# Patient Record
Sex: MALE | Race: WHITE | Employment: OTHER | URBAN - METROPOLITAN AREA
[De-identification: names, ages, dates, MRNs, and addresses within clinical notes are randomized per-mention and may not be internally consistent; named-entity substitution may affect disease eponyms.]

---

## 2017-01-31 PROBLEM — I10 HTN (HYPERTENSION): Status: ACTIVE | Noted: 2017-01-31

## 2017-01-31 PROBLEM — E78.5 HLD (HYPERLIPIDEMIA): Status: ACTIVE | Noted: 2017-01-31

## 2017-01-31 PROBLEM — K21.9 GERD (GASTROESOPHAGEAL REFLUX DISEASE): Status: ACTIVE | Noted: 2017-01-31

## 2017-01-31 PROBLEM — I49.1 PAC (PREMATURE ATRIAL CONTRACTION): Status: ACTIVE | Noted: 2017-01-31

## 2017-01-31 PROBLEM — N40.0 BPH (BENIGN PROSTATIC HYPERPLASIA): Status: ACTIVE | Noted: 2017-01-31

## 2017-08-07 PROBLEM — Z98.890 S/P DILATATION OF ESOPHAGEAL STRICTURE: Status: ACTIVE | Noted: 2017-08-07

## 2017-08-07 PROBLEM — Z87.19 S/P DILATATION OF ESOPHAGEAL STRICTURE: Status: ACTIVE | Noted: 2017-08-07

## 2017-08-07 PROBLEM — K44.9 HIATAL HERNIA: Status: ACTIVE | Noted: 2017-08-07

## 2018-04-28 ENCOUNTER — APPOINTMENT (OUTPATIENT)
Dept: CT IMAGING | Age: 83
End: 2018-04-28
Attending: EMERGENCY MEDICINE
Payer: MEDICARE

## 2018-04-28 ENCOUNTER — APPOINTMENT (OUTPATIENT)
Dept: GENERAL RADIOLOGY | Age: 83
End: 2018-04-28
Attending: EMERGENCY MEDICINE
Payer: MEDICARE

## 2018-04-28 ENCOUNTER — HOSPITAL ENCOUNTER (OUTPATIENT)
Age: 83
Setting detail: OBSERVATION
Discharge: HOME | End: 2018-04-29
Attending: EMERGENCY MEDICINE | Admitting: FAMILY MEDICINE
Payer: MEDICARE

## 2018-04-28 DIAGNOSIS — T78.2XXA ANAPHYLAXIS, INITIAL ENCOUNTER: Primary | ICD-10-CM

## 2018-04-28 PROBLEM — I10 HTN (HYPERTENSION): Status: RESOLVED | Noted: 2017-01-31 | Resolved: 2018-04-28

## 2018-04-28 PROBLEM — L50.9 URTICARIA: Status: ACTIVE | Noted: 2018-04-28

## 2018-04-28 PROBLEM — N17.9 AKI (ACUTE KIDNEY INJURY) (HCC): Status: ACTIVE | Noted: 2018-04-28

## 2018-04-28 PROBLEM — I10 HTN (HYPERTENSION): Chronic | Status: ACTIVE | Noted: 2017-01-31

## 2018-04-28 PROBLEM — R22.0 LIP SWELLING: Status: RESOLVED | Noted: 2018-04-28 | Resolved: 2018-04-28

## 2018-04-28 PROBLEM — E78.5 HLD (HYPERLIPIDEMIA): Chronic | Status: ACTIVE | Noted: 2017-01-31

## 2018-04-28 PROBLEM — R22.0 LIP SWELLING: Status: ACTIVE | Noted: 2018-04-28

## 2018-04-28 PROBLEM — R55 SYNCOPE AND COLLAPSE: Status: ACTIVE | Noted: 2018-04-28

## 2018-04-28 PROBLEM — K21.9 GERD (GASTROESOPHAGEAL REFLUX DISEASE): Chronic | Status: ACTIVE | Noted: 2017-01-31

## 2018-04-28 LAB
ALBUMIN SERPL-MCNC: 2.6 G/DL (ref 3.2–4.6)
ALBUMIN/GLOB SERPL: 1.2 {RATIO} (ref 1.2–3.5)
ALP SERPL-CCNC: 30 U/L (ref 50–136)
ALT SERPL-CCNC: 15 U/L (ref 12–65)
ANION GAP SERPL CALC-SCNC: 12 MMOL/L (ref 7–16)
AST SERPL-CCNC: 11 U/L (ref 15–37)
ATRIAL RATE: 77 BPM
BASOPHILS # BLD: 0 K/UL (ref 0–0.2)
BASOPHILS NFR BLD: 0 % (ref 0–2)
BILIRUB SERPL-MCNC: 0.9 MG/DL (ref 0.2–1.1)
BUN SERPL-MCNC: 24 MG/DL (ref 8–23)
CALCIUM SERPL-MCNC: 7.7 MG/DL (ref 8.3–10.4)
CALCULATED P AXIS, ECG09: 20 DEGREES
CALCULATED R AXIS, ECG10: -27 DEGREES
CALCULATED T AXIS, ECG11: 33 DEGREES
CHLORIDE SERPL-SCNC: 106 MMOL/L (ref 98–107)
CO2 SERPL-SCNC: 23 MMOL/L (ref 21–32)
CREAT SERPL-MCNC: 2.11 MG/DL (ref 0.8–1.5)
DIAGNOSIS, 93000: NORMAL
DIFFERENTIAL METHOD BLD: ABNORMAL
EOSINOPHIL # BLD: 0 K/UL (ref 0–0.8)
EOSINOPHIL NFR BLD: 0 % (ref 0.5–7.8)
ERYTHROCYTE [DISTWIDTH] IN BLOOD BY AUTOMATED COUNT: 13.2 % (ref 11.9–14.6)
GLOBULIN SER CALC-MCNC: 2.2 G/DL (ref 2.3–3.5)
GLUCOSE SERPL-MCNC: 177 MG/DL (ref 65–100)
HCT VFR BLD AUTO: 42.1 % (ref 41.1–50.3)
HGB BLD-MCNC: 14.4 G/DL (ref 13.6–17.2)
IMM GRANULOCYTES # BLD: 0 K/UL (ref 0–0.5)
IMM GRANULOCYTES NFR BLD AUTO: 0 % (ref 0–5)
LACTATE BLD-SCNC: 1.7 MMOL/L (ref 0.5–1.9)
LYMPHOCYTES # BLD: 0.7 K/UL (ref 0.5–4.6)
LYMPHOCYTES NFR BLD: 7 % (ref 13–44)
MCH RBC QN AUTO: 29 PG (ref 26.1–32.9)
MCHC RBC AUTO-ENTMCNC: 34.2 G/DL (ref 31.4–35)
MCV RBC AUTO: 84.9 FL (ref 79.6–97.8)
MONOCYTES # BLD: 0.5 K/UL (ref 0.1–1.3)
MONOCYTES NFR BLD: 5 % (ref 4–12)
NEUTS SEG # BLD: 8.8 K/UL (ref 1.7–8.2)
NEUTS SEG NFR BLD: 88 % (ref 43–78)
P-R INTERVAL, ECG05: 164 MS
PLATELET # BLD AUTO: 128 K/UL (ref 150–450)
PMV BLD AUTO: 10.8 FL (ref 10.8–14.1)
POTASSIUM SERPL-SCNC: 3.7 MMOL/L (ref 3.5–5.1)
PROT SERPL-MCNC: 4.8 G/DL (ref 6.3–8.2)
Q-T INTERVAL, ECG07: 402 MS
QRS DURATION, ECG06: 68 MS
QTC CALCULATION (BEZET), ECG08: 454 MS
RBC # BLD AUTO: 4.96 M/UL (ref 4.23–5.67)
SODIUM SERPL-SCNC: 141 MMOL/L (ref 136–145)
TROPONIN I SERPL-MCNC: <0.04 NG/ML (ref 0.02–0.05)
VENTRICULAR RATE, ECG03: 77 BPM
WBC # BLD AUTO: 10 K/UL (ref 4.3–11.1)

## 2018-04-28 PROCEDURE — 99218 HC RM OBSERVATION: CPT

## 2018-04-28 PROCEDURE — 84484 ASSAY OF TROPONIN QUANT: CPT | Performed by: EMERGENCY MEDICINE

## 2018-04-28 PROCEDURE — 74011250636 HC RX REV CODE- 250/636: Performed by: FAMILY MEDICINE

## 2018-04-28 PROCEDURE — 85025 COMPLETE CBC W/AUTO DIFF WBC: CPT | Performed by: EMERGENCY MEDICINE

## 2018-04-28 PROCEDURE — 93005 ELECTROCARDIOGRAM TRACING: CPT | Performed by: EMERGENCY MEDICINE

## 2018-04-28 PROCEDURE — 96375 TX/PRO/DX INJ NEW DRUG ADDON: CPT

## 2018-04-28 PROCEDURE — 96375 TX/PRO/DX INJ NEW DRUG ADDON: CPT | Performed by: EMERGENCY MEDICINE

## 2018-04-28 PROCEDURE — 96374 THER/PROPH/DIAG INJ IV PUSH: CPT | Performed by: EMERGENCY MEDICINE

## 2018-04-28 PROCEDURE — 80053 COMPREHEN METABOLIC PANEL: CPT | Performed by: EMERGENCY MEDICINE

## 2018-04-28 PROCEDURE — 96376 TX/PRO/DX INJ SAME DRUG ADON: CPT

## 2018-04-28 PROCEDURE — 96361 HYDRATE IV INFUSION ADD-ON: CPT

## 2018-04-28 PROCEDURE — 96361 HYDRATE IV INFUSION ADD-ON: CPT | Performed by: EMERGENCY MEDICINE

## 2018-04-28 PROCEDURE — 74011000250 HC RX REV CODE- 250: Performed by: FAMILY MEDICINE

## 2018-04-28 PROCEDURE — 83605 ASSAY OF LACTIC ACID: CPT

## 2018-04-28 PROCEDURE — 74011250636 HC RX REV CODE- 250/636: Performed by: INTERNAL MEDICINE

## 2018-04-28 PROCEDURE — C8929 TTE W OR WO FOL WCON,DOPPLER: HCPCS

## 2018-04-28 PROCEDURE — 99285 EMERGENCY DEPT VISIT HI MDM: CPT | Performed by: EMERGENCY MEDICINE

## 2018-04-28 PROCEDURE — 81003 URINALYSIS AUTO W/O SCOPE: CPT | Performed by: EMERGENCY MEDICINE

## 2018-04-28 PROCEDURE — 96372 THER/PROPH/DIAG INJ SC/IM: CPT

## 2018-04-28 PROCEDURE — 96372 THER/PROPH/DIAG INJ SC/IM: CPT | Performed by: EMERGENCY MEDICINE

## 2018-04-28 PROCEDURE — 77030032490 HC SLV COMPR SCD KNE COVD -B

## 2018-04-28 PROCEDURE — 74011250636 HC RX REV CODE- 250/636: Performed by: EMERGENCY MEDICINE

## 2018-04-28 PROCEDURE — 70450 CT HEAD/BRAIN W/O DYE: CPT

## 2018-04-28 PROCEDURE — 74011250637 HC RX REV CODE- 250/637: Performed by: FAMILY MEDICINE

## 2018-04-28 PROCEDURE — 71045 X-RAY EXAM CHEST 1 VIEW: CPT

## 2018-04-28 RX ORDER — SODIUM CHLORIDE 9 MG/ML
125 INJECTION, SOLUTION INTRAVENOUS CONTINUOUS
Status: DISCONTINUED | OUTPATIENT
Start: 2018-04-28 | End: 2018-04-29 | Stop reason: HOSPADM

## 2018-04-28 RX ORDER — HEPARIN SODIUM 5000 [USP'U]/ML
5000 INJECTION, SOLUTION INTRAVENOUS; SUBCUTANEOUS EVERY 8 HOURS
Status: DISCONTINUED | OUTPATIENT
Start: 2018-04-28 | End: 2018-04-29 | Stop reason: HOSPADM

## 2018-04-28 RX ORDER — DIPHENHYDRAMINE HYDROCHLORIDE 50 MG/ML
25 INJECTION, SOLUTION INTRAMUSCULAR; INTRAVENOUS
Status: COMPLETED | OUTPATIENT
Start: 2018-04-28 | End: 2018-04-28

## 2018-04-28 RX ORDER — DIPHENHYDRAMINE HYDROCHLORIDE 50 MG/ML
25 INJECTION, SOLUTION INTRAMUSCULAR; INTRAVENOUS
Status: DISCONTINUED | OUTPATIENT
Start: 2018-04-28 | End: 2018-04-29 | Stop reason: HOSPADM

## 2018-04-28 RX ORDER — ACETAMINOPHEN 325 MG/1
650 TABLET ORAL
Status: DISCONTINUED | OUTPATIENT
Start: 2018-04-28 | End: 2018-04-29 | Stop reason: HOSPADM

## 2018-04-28 RX ORDER — PANTOPRAZOLE SODIUM 40 MG/1
40 TABLET, DELAYED RELEASE ORAL
Status: DISCONTINUED | OUTPATIENT
Start: 2018-04-28 | End: 2018-04-29 | Stop reason: HOSPADM

## 2018-04-28 RX ORDER — SODIUM CHLORIDE 0.9 % (FLUSH) 0.9 %
5-10 SYRINGE (ML) INJECTION EVERY 8 HOURS
Status: DISCONTINUED | OUTPATIENT
Start: 2018-04-28 | End: 2018-04-29 | Stop reason: HOSPADM

## 2018-04-28 RX ORDER — SIMVASTATIN 40 MG/1
80 TABLET, FILM COATED ORAL
Status: DISCONTINUED | OUTPATIENT
Start: 2018-04-28 | End: 2018-04-29 | Stop reason: HOSPADM

## 2018-04-28 RX ORDER — TERAZOSIN 5 MG/1
10 CAPSULE ORAL
Status: DISCONTINUED | OUTPATIENT
Start: 2018-04-28 | End: 2018-04-29 | Stop reason: HOSPADM

## 2018-04-28 RX ORDER — SODIUM CHLORIDE 0.9 % (FLUSH) 0.9 %
5-10 SYRINGE (ML) INJECTION AS NEEDED
Status: DISCONTINUED | OUTPATIENT
Start: 2018-04-28 | End: 2018-04-29 | Stop reason: HOSPADM

## 2018-04-28 RX ORDER — EPINEPHRINE 1 MG/ML
0.3 INJECTION, SOLUTION, CONCENTRATE INTRAVENOUS ONCE
Status: COMPLETED | OUTPATIENT
Start: 2018-04-28 | End: 2018-04-28

## 2018-04-28 RX ORDER — DIPHENHYDRAMINE HCL 25 MG
25 CAPSULE ORAL EVERY 6 HOURS
Status: DISCONTINUED | OUTPATIENT
Start: 2018-04-28 | End: 2018-04-28

## 2018-04-28 RX ADMIN — HEPARIN SODIUM 5000 UNITS: 5000 INJECTION, SOLUTION INTRAVENOUS; SUBCUTANEOUS at 20:39

## 2018-04-28 RX ADMIN — METHYLPREDNISOLONE SODIUM SUCCINATE 60 MG: 125 INJECTION, POWDER, FOR SOLUTION INTRAMUSCULAR; INTRAVENOUS at 09:37

## 2018-04-28 RX ADMIN — SODIUM CHLORIDE 1000 ML: 900 INJECTION, SOLUTION INTRAVENOUS at 03:03

## 2018-04-28 RX ADMIN — TERAZOSIN HYDROCHLORIDE 10 MG: 5 CAPSULE ORAL at 20:39

## 2018-04-28 RX ADMIN — DIPHENHYDRAMINE HYDROCHLORIDE 25 MG: 50 INJECTION, SOLUTION INTRAMUSCULAR; INTRAVENOUS at 20:38

## 2018-04-28 RX ADMIN — SIMVASTATIN 80 MG: 40 TABLET, FILM COATED ORAL at 20:39

## 2018-04-28 RX ADMIN — PERFLUTREN 1 ML: 6.52 INJECTION, SUSPENSION INTRAVENOUS at 08:00

## 2018-04-28 RX ADMIN — Medication 10 ML: at 14:25

## 2018-04-28 RX ADMIN — DIPHENHYDRAMINE HYDROCHLORIDE 25 MG: 25 CAPSULE ORAL at 05:42

## 2018-04-28 RX ADMIN — HEPARIN SODIUM 5000 UNITS: 5000 INJECTION, SOLUTION INTRAVENOUS; SUBCUTANEOUS at 14:00

## 2018-04-28 RX ADMIN — Medication 10 ML: at 05:42

## 2018-04-28 RX ADMIN — DIPHENHYDRAMINE HYDROCHLORIDE 25 MG: 50 INJECTION, SOLUTION INTRAMUSCULAR; INTRAVENOUS at 15:56

## 2018-04-28 RX ADMIN — SODIUM CHLORIDE 1000 ML: 900 INJECTION, SOLUTION INTRAVENOUS at 05:43

## 2018-04-28 RX ADMIN — PANTOPRAZOLE SODIUM 40 MG: 40 TABLET, DELAYED RELEASE ORAL at 07:56

## 2018-04-28 RX ADMIN — SODIUM CHLORIDE 1000 ML: 900 INJECTION, SOLUTION INTRAVENOUS at 02:22

## 2018-04-28 RX ADMIN — HEPARIN SODIUM 5000 UNITS: 5000 INJECTION, SOLUTION INTRAVENOUS; SUBCUTANEOUS at 05:42

## 2018-04-28 RX ADMIN — METHYLPREDNISOLONE SODIUM SUCCINATE 125 MG: 125 INJECTION, POWDER, FOR SOLUTION INTRAMUSCULAR; INTRAVENOUS at 03:40

## 2018-04-28 RX ADMIN — DIPHENHYDRAMINE HYDROCHLORIDE 25 MG: 25 CAPSULE ORAL at 11:29

## 2018-04-28 RX ADMIN — EPINEPHRINE 0.3 MG: 1 INJECTION, SOLUTION, CONCENTRATE INTRAVENOUS at 03:40

## 2018-04-28 RX ADMIN — SODIUM CHLORIDE 125 ML/HR: 900 INJECTION, SOLUTION INTRAVENOUS at 04:28

## 2018-04-28 RX ADMIN — Medication 1 AMPULE: at 20:39

## 2018-04-28 RX ADMIN — DIPHENHYDRAMINE HYDROCHLORIDE 25 MG: 50 INJECTION, SOLUTION INTRAMUSCULAR; INTRAVENOUS at 03:40

## 2018-04-28 RX ADMIN — Medication 1 AMPULE: at 09:37

## 2018-04-28 RX ADMIN — FAMOTIDINE 20 MG: 10 INJECTION, SOLUTION INTRAVENOUS at 09:37

## 2018-04-28 RX ADMIN — SODIUM CHLORIDE 125 ML/HR: 900 INJECTION, SOLUTION INTRAVENOUS at 07:28

## 2018-04-28 NOTE — PROGRESS NOTES
Admission assessment complete. Pt oriented to room and menu. Pt up in recliner. Hives/rash are still pink and evident over ankles, back, hips, and abdomen. No needs at this time.

## 2018-04-28 NOTE — ED NOTES
TRANSFER - OUT REPORT:    Verbal report given to nedra(name) on Stormy Jordan  being transferred to icu(unit) for ordered procedure       Report consisted of patients Situation, Background, Assessment and   Recommendations(SBAR). Information from the following report(s) SBAR was reviewed with the receiving nurse. Lines:   Peripheral IV 04/28/18 Left Antecubital (Active)   Site Assessment Clean, dry, & intact 4/28/2018  2:01 AM   Phlebitis Assessment 0 4/28/2018  2:01 AM   Infiltration Assessment 0 4/28/2018  2:01 AM        Opportunity for questions and clarification was provided.       Patient transported with:   Monitor

## 2018-04-28 NOTE — PROGRESS NOTES
Pt admitted from ED. Pt alert and oriented. Breath sounds clear. S1S2. Abdomen soft, intact, active bowel sounds. Pulses palpable in all extremities. MD at bedside for evaluation. Dual skin assessment performed with Khoa Rutledge RN. Pt with hives to chest, sides, and lower back, ankles, and feet. Small abrasion to right forehead and left knee. Sacrum intact. Allevyn not placed at this time due to high deloris score.

## 2018-04-28 NOTE — IP AVS SNAPSHOT
[address] 
[phone] Patient: Corrinne Hue MRN: [id number] :1935 A check mike indicates which time of day the medication should be taken. My Medications CONTINUE taking these medications Instructions Each Dose to Equal  
 Morning Noon Evening Bedtime  
 omeprazole 20 mg capsule Commonly known as:  PRILOSEC Your last dose was: Your next dose is: Take 1 Cap by mouth daily. 20 mg  
    
   
   
   
  
 simvastatin 80 mg tablet Commonly known as:  ZOCOR Your last dose was: Your next dose is: Take 40 mg by mouth nightly. 40 mg  
    
   
   
   
  
 terazosin 10 mg capsule Commonly known as:  HYTRIN Your last dose was: Your next dose is: Take 10 mg by mouth nightly. 10 mg  
    
   
   
   
  
  
STOP taking these medications   
 lisinopril-hydroCHLOROthiazide 20-12.5 mg per tablet Commonly known as:  Rajan Davis

## 2018-04-28 NOTE — ED NOTES
Pt presents to ER via EMS for hypotension and syncope. EMS was initially called after the spouse and pt was unable to find a radial pulse w/ pt acting out of his normal mentation. While waiting for EMS, pt passed out. EMS was unable to palpate radial pulse, IVF initiated by crew and IVF started, no BP determined per EMS throughout trip w/ pt arriving w/ [facility] at approx 80 degrees. Pt placed in supine position and noted BP 82/52, placed in trendelenburg and MD at [facility], pt currently w/ positive peripheral pulses, A&O x 3 at this time, spouse at [facility].

## 2018-04-28 NOTE — ED PROVIDER NOTES
HPI Comments: Patient is an 81-year-old male who is coming in with syncopal episode and difficulty getting radial pulse per EMS. They're unable to obtain any blood pressure the patient was alert and oriented during transport. Wife states that patient was feeling bad in bed and woke her up and they called EMS she went downstairs to open the door for them and he passed out and fell out of bed. Earlier in the evening patient did not feel that well they thought he had some swelling of his lips and wife did give him some Benadryl he went to sleep following this. No rashes. He denies any chest pain or headache. He has had no shortness of breath for palpitations. Patient states he is unsure while he was on the floor. Wife states he does not drink much fluids. Patient is a 81 y.o. male presenting with syncope and hypotension. The history is provided by the patient. Syncope    Associated symptoms include dizziness and weakness. Pertinent negatives include no chest pain, no palpitations, no fever, no abdominal pain, no nausea and no vomiting. His past medical history is significant for syncope. Hypotension    Associated symptoms include weakness. Past Medical History:   Diagnosis Date    GERD (gastroesophageal reflux disease)     Hypercholesterolemia     Hypertension        Past Surgical History:   Procedure Laterality Date    HX APPENDECTOMY  1988         No family history on file. Social History     Social History    Marital status:      Spouse name: N/A    Number of children: N/A    Years of education: N/A     Occupational History    Not on file. Social History Main Topics    Smoking status: Never Smoker    Smokeless tobacco: Never Used    Alcohol use No    Drug use: No    Sexual activity: Not on file     Other Topics Concern    Not on file     Social History Narrative         ALLERGIES: Review of patient's allergies indicates no known allergies.     Review of Systems Constitutional: Negative for chills and fever. Respiratory: Negative for chest tightness, shortness of breath and wheezing. Cardiovascular: Positive for syncope. Negative for chest pain and palpitations. Gastrointestinal: Negative for abdominal pain, diarrhea, nausea and vomiting. Skin: Negative. Neurological: Positive for dizziness, syncope and weakness. All other systems reviewed and are negative. Vitals:    04/28/18 0153 04/28/18 0215   BP: (!) 82/52 95/57   Pulse: 81 76   Resp: 18 13   Temp: 98 °F (36.7 °C)    SpO2: 95% 97%   Weight: 81.6 kg (180 lb)    Height: 5' 10\" (1.778 m)             Physical Exam   Constitutional: He is oriented to person, place, and time. He appears well-developed and well-nourished. No distress. HENT:   Head: Normocephalic and atraumatic. Eyes: Conjunctivae are normal. No scleral icterus. Neck: Normal range of motion. Neck supple. Cardiovascular: Normal rate, regular rhythm and normal heart sounds. Pulmonary/Chest: Effort normal and breath sounds normal. No stridor. No respiratory distress. He has no wheezes. He has no rales. He exhibits no tenderness. Abdominal: Soft. Bowel sounds are normal. He exhibits no distension. There is no tenderness. There is no rebound and no guarding. Neurological: He is alert and oriented to person, place, and time. No cranial nerve deficit. Coordination normal.   No focal weakness   Skin: Skin is warm and dry. No rash noted. He is not diaphoretic. No erythema. Psychiatric: He has a normal mood and affect. His behavior is normal.   Nursing note and vitals reviewed. MDM  Number of Diagnoses or Management Options  Anaphylaxis, initial encounter:   Diagnosis management comments: Patient continued to be slightly hypertensive responding to fluids he received 3 L in the ED she did just break out in urticaria over the last 30 minutes. Thus making anaphylaxis the most likely cause of his hypotension and syncope.   The rest of his workup has been fairly unremarkable. I am admitting him to the hospitalist and we are giving epinephrine and Solu-Medrol and IV Benadryl. Vinicio Victor MD; 4/28/2018 @4:01 AM Voice dictation software was used during the making of this note. This software is not perfect and grammatical and other typographical errors may be present.   This note has not been proofread for errors.  ===================================================================        Amount and/or Complexity of Data Reviewed  Clinical lab tests: ordered and reviewed (Results for orders placed or performed during the hospital encounter of 04/28/18  -CBC WITH AUTOMATED DIFF       Result                                            Value                         Ref Range                       WBC                                               10.0                          4.3 - 11.1 K/uL                 RBC                                               4.96                          4.23 - 5.67 M/uL                HGB                                               14.4                          13.6 - 17.2 g/dL                HCT                                               42.1                          41.1 - 50.3 %                   MCV                                               84.9                          79.6 - 97.8 FL                  MCH                                               29.0                          26.1 - 32.9 PG                  MCHC                                              34.2                          31.4 - 35.0 g/dL                RDW                                               13.2                          11.9 - 14.6 %                   PLATELET                                          128 (L)                       150 - 450 K/uL                  MPV                                               10.8                          10.8 - 14.1 FL                  DF AUTOMATED                                                     NEUTROPHILS                                       88 (H)                        43 - 78 %                       LYMPHOCYTES                                       7 (L)                         13 - 44 %                       MONOCYTES                                         5                             4.0 - 12.0 %                    EOSINOPHILS                                       0 (L)                         0.5 - 7.8 %                     BASOPHILS                                         0                             0.0 - 2.0 %                     IMMATURE GRANULOCYTES                             0                             0.0 - 5.0 %                     ABS. NEUTROPHILS                                  8.8 (H)                       1.7 - 8.2 K/UL                  ABS. LYMPHOCYTES                                  0.7                           0.5 - 4.6 K/UL                  ABS. MONOCYTES                                    0.5                           0.1 - 1.3 K/UL                  ABS. EOSINOPHILS                                  0.0                           0.0 - 0.8 K/UL                  ABS. BASOPHILS                                    0.0                           0.0 - 0.2 K/UL                  ABS. IMM.  GRANS.                                  0.0                           0.0 - 0.5 K/UL             -METABOLIC PANEL, COMPREHENSIVE       Result                                            Value                         Ref Range                       Sodium                                            141                           136 - 145 mmol/L                Potassium                                         3.7                           3.5 - 5.1 mmol/L                Chloride                                          106                           98 - 107 mmol/L                 CO2                                               23 21 - 32 mmol/L                  Anion gap                                         12                            7 - 16 mmol/L                   Glucose                                           177 (H)                       65 - 100 mg/dL                  BUN                                               24 (H)                        8 - 23 MG/DL                    Creatinine                                        2.11 (H)                      0.8 - 1.5 MG/DL                 GFR est AA                                        39 (L)                        >60 ml/min/1.73m2               GFR est non-AA                                    32 (L)                        >60 ml/min/1.73m2               Calcium                                           7.7 (L)                       8.3 - 10.4 MG/DL                Bilirubin, total                                  0.9                           0.2 - 1.1 MG/DL                 ALT (SGPT)                                        15                            12 - 65 U/L                     AST (SGOT)                                        11 (L)                        15 - 37 U/L                     Alk. phosphatase                                  30 (L)                        50 - 136 U/L                    Protein, total                                    4.8 (L)                       6.3 - 8.2 g/dL                  Albumin                                           2.6 (L)                       3.2 - 4.6 g/dL                  Globulin                                          2.2 (L)                       2.3 - 3.5 g/dL                  A-G Ratio                                         1.2                           1.2 - 3.5                  -TROPONIN I       Result                                            Value                         Ref Range                       Troponin-I, Qt.                                   <0.04                         0.02 - 0.05 NG/ML          -POC LACTIC ACID       Result                                            Value                         Ref Range                       Lactic Acid (POC)                                 1.7                           0.5 - 1.9 mmol/L          )  Tests in the radiology section of CPT®: ordered and reviewed (Ct Head Wo Cont    Result Date: 4/28/2018  CT HEAD WITHOUT CONTRAST HISTORY:  Syncope, head trauma. . COMPARISON: None. TECHNIQUE: Axial imaging was performed without intravenous contrast utilizing 5mm slice thickness. Sagittal and coronal reformats were performed. Radiation dose reduction techniques were used for this study. Our CT scanner uses one or all of the following: Automated exposure control, adjustment of the MAS or KUB according to patient's size and iterative reconstruction. FINDINGS:    *BRAIN:    -  There are no early signs of territorial or lacunar infarction by CT.    -  No intracranial mass, hematoma, or hydrocephalus.    -  No gross white matter abnormality by CT. *VISUALIZED PARANASAL SINUSES: Well aerated. *MASTOIDS:  Clear. *CALVARIUM AND SCALP: Unremarkable. IMPRESSION: Unremarkable brain CT without intravenous contrast. Date of Dictation: 4/28/2018 2:56 AM     Xr Chest Port    Result Date: 4/28/2018  EXAM:  XR CHEST PORT INDICATION:  syncope COMPARISON:  None. FINDINGS: A portable AP radiograph of the chest was obtained at 0246 hours. The patient is on a cardiac monitor. The lungs are clear. The cardiac and mediastinal contours and pulmonary vascularity are normal.  The bones and soft tissues are grossly within normal limits.      IMPRESSION: Normal chest.    )  Discuss the patient with other providers: yes  Independent visualization of images, tracings, or specimens: yes    Critical Care  Total time providing critical care: 30-74 minutes (35 minutes)        ED Course       Procedures

## 2018-04-28 NOTE — IP AVS SNAPSHOT
[address] 
[phone] Patient: Pierre March MRN: [id number] :1935 About your hospitalization You were admitted on:  2018 You last received care in the:  [facility] 1 You were discharged on:  2018 Why you were hospitalized Your primary diagnosis was:  Adam (Acute Kidney Injury) (Hcc) Your diagnoses also included:  Anaphylaxis, Syncope And Collapse, Urticaria, Lip Swelling, Htn (Hypertension), Gerd (Gastroesophageal Reflux Disease), Hld (Hyperlipidemia) Follow-up Information Follow up With Details Comments Contact Info Efrem Rushing MD Schedule an appointment as soon as possible for a visit follow up blood pressure and referral to allergist [address] [place] [phone] Discharge Orders None A check mike indicates which time of day the medication should be taken. My Medications CONTINUE taking these medications Instructions Each Dose to Equal  
 Morning Noon Evening Bedtime  
 omeprazole 20 mg capsule Commonly known as:  PRILOSEC Your last dose was: Your next dose is: Take 1 Cap by mouth daily. 20 mg  
    
   
   
   
  
 simvastatin 80 mg tablet Commonly known as:  ZOCOR Your last dose was: Your next dose is: Take 40 mg by mouth nightly. 40 mg  
    
   
   
   
  
 terazosin 10 mg capsule Commonly known as:  HYTRIN Your last dose was: Your next dose is: Take 10 mg by mouth nightly. 10 mg  
    
   
   
   
  
  
STOP taking these medications   
 lisinopril-hydroCHLOROthiazide 20-12.5 mg per tablet Commonly known as:  Asif Oropeza Discharge Instructions 1) GET AN APPOINTMENT TO SEE YOUR MEDICAL PHYSICIAN AND TO SEE AN \"ALLERGIST\" AS  REQUESTED BY YOUR PHYSICIAN HERE. ... Anaphylactic Reaction: Care Instructions Your Care Instructions A bad allergic reaction affects your whole body. Doctors call it an anaphylactic reaction. Your immune system may have reacted to food or medicine. Or maybe you had an insect bite or sting. This kind of reaction can happen the first time you come into contact with a substance. Or it may take many times before a substance causes a problem. You need to get help right away if your body reacts like this again. Follow-up care is a key part of your treatment and safety. Be sure to make and go to all appointments, and call your doctor if you are having problems. It's also a good idea to know your test results and keep a list of the medicines you take. How can you care for yourself at home? · If your doctor has prescribed medicine, such as an antihistamine, take it exactly as directed. Call your doctor if you think you are having a problem with your medicine. · Learn all you can about your allergies. You may be able to avoid a severe response when you do or don't do certain things. For instance, you can check food or drug labels for contents that might cause problems. · Your doctor may prescribe a shot of epinephrine to carry with you in case you have a severe reaction. Learn how to give yourself the shot. Keep it with you at all times. Make sure it has not . · Wear medical alert jewelry that lists your allergies. You can buy this at most drugstores. · Teach your family and friends about your allergies. Tell them what you need to avoid. Teach them what to do if you have a reaction. · Before you take any medicine, tell your doctor if you have had a bad response to any medicines in the past. 
When should you call for help? Give an epinephrine shot if: 
? · You think you are having a severe allergic reaction. ? After giving an epinephrine shot call 911, even if you feel better. ?Call 911 if: 
? · You have symptoms of a severe allergic reaction. These may include: 
¨ Sudden raised, red areas (hives) all over your body. ¨ Swelling of the throat, mouth, lips, or tongue. ¨ Trouble breathing. ¨ Passing out (losing consciousness). Or you may feel very lightheaded or suddenly feel weak, confused, or restless. ? · You have been given an epinephrine shot, even if you feel better. ?Call your doctor now or seek immediate medical care if: 
? · You have symptoms of an allergic reaction, such as: ¨ A rash or hives (raised, red areas on the skin). ¨ Itching. ¨ Swelling. ¨ Belly pain, nausea, or vomiting. ? Watch closely for changes in your health, and be sure to contact your doctor if: 
? · You do not get better as expected. Where can you learn more? Go to http://massimo-chelsea.info/. Enter E186 in the search box to learn more about \"Anaphylactic Reaction: Care Instructions. \" Current as of: September 29, 2016 Content Version: 11.4 © 2680-6710 [employer]. Care instructions adapted under license by [employer] (which disclaims liability or warranty for this information). If you have questions about a medical condition or this instruction, always ask your healthcare professional. [address] any warranty or liability for your use of this information. ACO Transitions of Care [facility] offers a voluntary care coordination program to provide high quality service and care to [facility] fee-for-service beneficiaries. Eduard Castano was designed to help you enhance your health and well-being through the following services: ? Transitions of Care  support for individuals who are transitioning from one care setting to another (example: Hospital to home). ?  Chronic and Complex Care Coordination  support for individuals and caregivers of those with serious or chronic illnesses or with more than one chronic (ongoing) condition and those who take a number of different medications. If you meet specific medical criteria, a [facility] may call you directly to coordinate your care with your primary care physician and your other care providers. For questions about the [facility] programs, please, contact your physicians office. For general questions or additional information about Accountable Care Organizations: 
Please visit www.medicare.gov/acos. html or call 1-800-MEDICARE (9-484.322.5419) TTY users should call 2-932.769.6869. Stylecrook Announcement We are excited to announce that we are making your provider's discharge notes available to you in Stylecrook. You will see these notes when they are completed and signed by the physician that discharged you from your recent hospital stay. If you have any questions or concerns about any information you see in Stylecrook, please call the Health Information Department where you were seen or reach out to your Primary Care Provider for more information about your plan of care. Introducing [facility]! Chitra Calvillo introduces Stylecrook patient portal. Now you can access parts of your medical record, email your doctor's office, and request medication refills online. 1. In your internet browser, go to [email or web address] 2. Click on the First Time User? Click Here link in the Sign In box. You will see the New Member Sign Up page. 3. Enter your Stylecrook Access Code exactly as it appears below. You will not need to use this code after youve completed the sign-up process. If you do not sign up before the expiration date, you must request a new code. · Stylecrook Access Code: [email or web address] Expires: 7/27/2018  1:54 AM 
 
4.  Enter the last four digits of your Social Security Number (xxxx) and Date of Birth (mm/dd/yyyy) as indicated and click Submit. You will be taken to the next sign-up page. 5. Create a Ringthree Technolog[employer] ID. This will be your Kyruus login ID and cannot be changed, so think of one that is secure and easy to remember. 6. Create a [employer] password. You can change your password at any time. 7. Enter your Password Reset Question and Answer. This can be used at a later time if you forget your password. 8. Enter your e-mail address. You will receive e-mail notification when new information is available in [address] 9. Click Sign Up. You can now view and download portions of your medical record. 10. Click the Download Summary menu link to download a portable copy of your medical information. If you have questions, please visit the Frequently Asked Questions section of the Kyruus website. Remember, Kyruus is NOT to be used for urgent needs. For medical emergencies, dial 911. Now available from your iPhone and Android! Introducing Ken Tang As a "[employer] patient, I wanted to make you aware of our electronic visit tool called Ken Sotelofin. [address] Road 24/7 allows you to connect within minutes with a medical provider 24 hours a day, seven days a week via a mobile device or tablet or logging into a secure website from your computer. You can access Ken Tang from anywhere in the [place]. A virtual visit might be right for you when you have a simple condition and feel like you just dont want to get out of bed, or cant get away from work for an appointment, when your regular "[employer] provider is not available (evenings, weekends or holidays), or when youre out of town and need minor care. Electronic visits cost only $49 and if the "[employer] 24/7 provider determines a prescription is needed to treat your condition, one can be electronically transmitted to a nearby pharmacy*. Please take a moment to enroll today if you have not already done so. The enrollment process is free and takes just a few minutes. To enroll, please download the [email or web address] 24/7 triston to your tablet or phone, or visit [email or web address] to enroll on your computer. And, as an [address] [place] [address] patient with a SafetyWeb account, the results of your visits will be scanned into your electronic medical record and your primary care provider will be able to view the scanned results. We urge you to continue to see your regular [email or web address] provider for your ongoing medical care. And while your primary care provider may not be the one available when you seek a Kickstarterrickyfin virtual visit, the peace of mind you get from getting a real diagnosis real time can be priceless. For more information on Kickstarterrickyfin, view our Frequently Asked Questions (FAQs) at [email or web address]. Sincerely, 
 
Rafita Turner MD 
Chief Medical Officer [address] [place] [address] *:  certain medications cannot be prescribed via [employer] Providers Seen During Your Hospitalization Provider Specialty Primary office phone Ruta Saint, MD Emergency Medicine [phone] Mona Calvo MD [facility] [phone] Your Primary Care Physician (PCP) Primary Care Physician Office Phone Office Fax Onel Enriquez [phone] You are allergic to the following No active allergies Recent Documentation Height Weight BMI Smoking Status 1.778 m 82.8 kg 26.19 kg/m2 Never Smoker Emergency Contacts Name Discharge Info Relation Home Work Mobile Shirin Daniel  Spouse [3] [phone] Patient Belongings The following personal items are in your possession at time of discharge: 
  Dental Appliances:  Other (comment) (bridge)  Visual Aid: None      Home Medications: Kept at bedside   Jewelry: Ring  Clothing: None    Other Valuables: None Please provide this summary of care documentation to your next provider. Signatures-by signing, you are acknowledging that this After Visit Summary has been reviewed with you and you have received a copy. Patient Signature:  ____________________________________________________________ Date:  ____________________________________________________________  
  
Ari Box Elder Provider Signature:  ____________________________________________________________ Date:  ____________________________________________________________

## 2018-04-28 NOTE — PROGRESS NOTES
TRANSFER - IN REPORT:    Verbal report received from [address] [place] [address] RN(name) on [employer]  being received from ED(unit) for routine progression of care      Report consisted of patients Situation, Background, Assessment and   Recommendations(SBAR). Information from the following report(s) SBAR, Kardex, ED Summary, Procedure Summary, Intake/Output, MAR, Recent Results and Cardiac Rhythm NSR was reviewed with the receiving nurse. Opportunity for questions and clarification was provided. Assessment completed upon patients arrival to unit and care assumed.

## 2018-04-28 NOTE — PROGRESS NOTES
Bedside report received from [facility]. Patient alert and oriented x4, VSS, on room air. Dual skin assessment shows hives to bilateral ankles, hips, lower/lateral abdomen. Lips slightly swollen, per patient better than yesterday. No complaints at this time.

## 2018-04-28 NOTE — PROGRESS NOTES
Hospitalist Follow Up Note     Admit Date:  2018  1:48 AM   Name:  Pierre March   Age:  81 y.o.  :  1935   MRN:  [id number]   PCP:  Efrem Rushing MD  Treatment Team: Attending Provider: David Del Toro MD; Utilization Review: Elida Lee RN    Patient was admitted after midnight today. This is a follow up to the H&P.     Objective:   Patient Vitals for the past 24 hrs:   Temp Pulse Resp BP SpO2   18 1215 - 80 20 119/71 96 %   18 1200 - 78 20 115/68 95 %   18 1130 98.1 °F (36.7 °C) - - - -   18 1115 - 80 30 118/72 95 %   18 1000 - 84 (!) 33 122/70 95 %   18 0900 - 84 23 114/64 93 %   18 0800 - 69 21 114/71 97 %   18 0758 97.9 °F (36.6 °C) 73 21 107/70 96 %   18 0700 - 75 18 98/58 96 %   18 0450 - 84 10 94/58 94 %   18 0430 - 88 17 97/59 94 %   18 0421 97.6 °F (36.4 °C) 89 19 106/64 97 %   18 0420 - 82 15 - 96 %   18 0400 - 92 13 (!) 83/53 (!) 89 %   18 0350 - 94 15 (!) 82/52 90 %   18 0340 - 89 30 (!) 82/48 95 %   18 0330 - 86 18 100/56 92 %   18 0320 - 85 30 109/58 95 %   18 0310 - 84 14 95/59 93 %   18 0300 - 85 25 96/55 92 %   18 0252 - 82 (!) 50 99/55 94 %   18 0215 - 76 13 95/57 97 %   18 0153 98 °F (36.7 °C) 81 18 (!) 82/52 95 %     Oxygen Therapy  O2 Sat (%): 96 % (18 1215)  Pulse via Oximetry: 81 beats per minute (18 1215)  O2 Device: Room air (18 0758)    Intake/Output Summary (Last 24 hours) at 18 1247  Last data filed at 18 1232   Gross per 24 hour   Intake            337.5 ml   Output              415 ml   Net            -77.5 ml         Current Meds:  Current Facility-Administered Medications   Medication Dose Route Frequency    pantoprazole (PROTONIX) tablet 40 mg  40 mg Oral ACB    simvastatin (ZOCOR) tablet 80 mg  80 mg Oral QHS    terazosin (HYTRIN) capsule 10 mg  10 mg Oral QHS    sodium chloride (NS) flush 5-10 mL  5-10 mL IntraVENous Q8H    sodium chloride (NS) flush 5-10 mL  5-10 mL IntraVENous PRN    0.9% sodium chloride infusion  125 mL/hr IntraVENous CONTINUOUS    heparin (porcine) injection 5,000 Units  5,000 Units SubCUTAneous Q8H    alcohol 62% (NOZIN) nasal  1 Ampule  1 Ampule Topical Q12H    diphenhydrAMINE (BENADRYL) injection 25 mg  25 mg IntraVENous Q4H PRN    acetaminophen (TYLENOL) tablet 650 mg  650 mg Oral Q6H PRN       Data Review:   Recent Results (from the past 24 hour(s))   EKG, 12 LEAD, INITIAL    Collection Time: 04/28/18  1:51 AM   Result Value Ref Range    Ventricular Rate 77 BPM    Atrial Rate 77 BPM    P-R Interval 164 ms    QRS Duration 68 ms    Q-T Interval 402 ms    QTC Calculation (Bezet) 454 ms    Calculated P Axis 20 degrees    Calculated R Axis -27 degrees    Calculated T Axis 33 degrees    Diagnosis       Normal sinus rhythm  Low voltage QRS  Borderline ECG  No previous ECGs available  Confirmed by ANY MONROE (), NUHA RAMIRES ([id number]) on 4/28/2018 11:41:49 AM     CBC WITH AUTOMATED DIFF    Collection Time: 04/28/18  2:21 AM   Result Value Ref Range    WBC 10.0 4.3 - 11.1 K/uL    RBC 4.96 4.23 - 5.67 M/uL    HGB 14.4 13.6 - 17.2 g/dL    HCT 42.1 41.1 - 50.3 %    MCV 84.9 79.6 - 97.8 FL    MCH 29.0 26.1 - 32.9 PG    MCHC 34.2 31.4 - 35.0 g/dL    RDW 13.2 11.9 - 14.6 %    PLATELET 882 (L) 633 - 450 K/uL    MPV 10.8 10.8 - 14.1 FL    DF AUTOMATED      NEUTROPHILS 88 (H) 43 - 78 %    LYMPHOCYTES 7 (L) 13 - 44 %    MONOCYTES 5 4.0 - 12.0 %    EOSINOPHILS 0 (L) 0.5 - 7.8 %    BASOPHILS 0 0.0 - 2.0 %    IMMATURE GRANULOCYTES 0 0.0 - 5.0 %    ABS. NEUTROPHILS 8.8 (H) 1.7 - 8.2 K/UL    ABS. LYMPHOCYTES 0.7 0.5 - 4.6 K/UL    ABS. MONOCYTES 0.5 0.1 - 1.3 K/UL    ABS. EOSINOPHILS 0.0 0.0 - 0.8 K/UL    ABS. BASOPHILS 0.0 0.0 - 0.2 K/UL    ABS. IMM.  GRANS. 0.0 0.0 - 0.5 K/UL   METABOLIC PANEL, COMPREHENSIVE    Collection Time: 04/28/18  2:21 AM   Result Value Ref Range Sodium 141 136 - 145 mmol/L    Potassium 3.7 3.5 - 5.1 mmol/L    Chloride 106 98 - 107 mmol/L    CO2 23 21 - 32 mmol/L    Anion gap 12 7 - 16 mmol/L    Glucose 177 (H) 65 - 100 mg/dL    BUN 24 (H) 8 - 23 MG/DL    Creatinine 2.11 (H) 0.8 - 1.5 MG/DL    GFR est AA 39 (L) >60 ml/min/1.73m2    GFR est non-AA 32 (L) >60 ml/min/1.73m2    Calcium 7.7 (L) 8.3 - 10.4 MG/DL    Bilirubin, total 0.9 0.2 - 1.1 MG/DL    ALT (SGPT) 15 12 - 65 U/L    AST (SGOT) 11 (L) 15 - 37 U/L    Alk. phosphatase 30 (L) 50 - 136 U/L    Protein, total 4.8 (L) 6.3 - 8.2 g/dL    Albumin 2.6 (L) 3.2 - 4.6 g/dL    Globulin 2.2 (L) 2.3 - 3.5 g/dL    A-G Ratio 1.2 1.2 - 3.5     TROPONIN I    Collection Time: 04/28/18  2:21 AM   Result Value Ref Range    Troponin-I, Qt. <0.04 0.02 - 0.05 NG/ML   POC LACTIC ACID    Collection Time: 04/28/18  2:33 AM   Result Value Ref Range    Lactic Acid (POC) 1.7 0.5 - 1.9 mmol/L       All Micro Results     Procedure Component Value Units Date/Time    CULTURE, BLOOD [[id number]]     Order Status:  Canceled Specimen:  Whole Blood from Blood     CULTURE, BLOOD [[id number]]     Order Status:  Canceled Specimen:  Whole Blood from Blood           Other Studies:  Ct Head Wo Cont    Result Date: 4/28/2018  CT HEAD WITHOUT CONTRAST HISTORY:  Syncope, head trauma. . COMPARISON: None. TECHNIQUE: Axial imaging was performed without intravenous contrast utilizing 5mm slice thickness. Sagittal and coronal reformats were performed. Radiation dose reduction techniques were used for this study. Our CT scanner uses one or all of the following: Automated exposure control, adjustment of the MAS or KUB according to patient's size and iterative reconstruction. FINDINGS:    *BRAIN:    -  There are no early signs of territorial or lacunar infarction by CT.    -  No intracranial mass, hematoma, or hydrocephalus.    -  No gross white matter abnormality by CT. *VISUALIZED PARANASAL SINUSES: Well aerated. *MASTOIDS:  Clear.  *CALVARIUM AND SCALP: Unremarkable. IMPRESSION: Unremarkable brain CT without intravenous contrast. Date of Dictation: 4/28/2018 2:56 AM     Xr Chest Port    Result Date: 4/28/2018  EXAM:  XR CHEST PORT INDICATION:  syncope COMPARISON:  None. FINDINGS: A portable AP radiograph of the chest was obtained at 0246 hours. The patient is on a cardiac monitor. The lungs are clear. The cardiac and mediastinal contours and pulmonary vascularity are normal.  The bones and soft tissues are grossly within normal limits. IMPRESSION: Normal chest.       Assessment and Plan:     Hospital Problems as of 4/28/2018  Date Reviewed: 8/7/2017          Codes Class Noted - Resolved POA    Syncope and collapse ICD-10-CM: R55  ICD-9-CM: 780.2  4/28/2018 - Present Yes        Urticaria ICD-10-CM: L50.9  ICD-9-CM: 708.9  4/28/2018 - Present Yes        * (Principal)HECTOR (acute kidney injury) ([facility] [address] ICD-10-CM: N17.9  ICD-9-CM: 584.9  4/28/2018 - Present Yes        GERD (gastroesophageal reflux disease) (Chronic) ICD-10-CM: K21.9  ICD-9-CM: 530.81  1/31/2017 - Present Yes        HLD (hyperlipidemia) (Chronic) ICD-10-CM: E78.5  ICD-9-CM: 272.4  1/31/2017 - Present Yes        RESOLVED: Anaphylaxis ICD-10-CM: T78. 2XXA  ICD-9-CM: 995.0  4/28/2018 - 4/28/2018 Yes        RESOLVED: Lip swelling ICD-10-CM: R22.0  ICD-9-CM: 784.2  4/28/2018 - 4/28/2018 Yes        RESOLVED: HTN (hypertension) ICD-10-CM: I10  ICD-9-CM: 401.9  1/31/2017 - 4/28/2018 Yes              PLAN:  · Agree with plan as documented in H&P with following comments/changes:  · Stop steroids. Hives essentially resolved. Lip swelling resolved. Monitor. · Syncope and HECTOR both probably from hypotension, stemming from dehydration. · He is also on lisinopril/HCTZ at home; says he has lost 20 pounds since being started on this medication; there is a good chance he does not need it anymore if he lost that much weight. No charge billed to patient for this follow up note.      Signed:  Sloan Guevara Autumn Vega MD

## 2018-04-28 NOTE — PROGRESS NOTES
MD notified of pharmacy question of pepcid gtt. See MAR for new orders. MD notified of pt borderline BP. Orders for 1000ml bolus over 2 hours.

## 2018-04-28 NOTE — PROGRESS NOTES
TRANSFER - OUT REPORT:    Verbal report given to Mora Kennedy RN on Emiila Campo  being transferred to ortho floor for routine progression of care    Report consisted of patients Situation, Background, Assessment and   Recommendations(SBAR). Information from the following report(s) SBAR, Kardex, ED Summary, Intake/Output, MAR, Recent Results, Med Rec Status and Cardiac Rhythm NSR/ST was reviewed with the receiving nurse. Lines:   Peripheral IV 04/28/18 Left Antecubital (Active)   Site Assessment Clean, dry, & intact 4/28/2018  7:58 AM   Phlebitis Assessment 0 4/28/2018  7:58 AM   Infiltration Assessment 0 4/28/2018  7:58 AM   Dressing Status Clean, dry, & intact 4/28/2018  7:58 AM   Dressing Type Tape;Transparent 4/28/2018  7:58 AM   Hub Color/Line Status Gray;Patent; Flushed; Infusing 4/28/2018  7:58 AM   Alcohol Cap Used No 4/28/2018  7:58 AM       Peripheral IV 04/28/18 Left Forearm (Active)   Site Assessment Clean, dry, & intact 4/28/2018  7:58 AM   Phlebitis Assessment 0 4/28/2018  7:58 AM   Infiltration Assessment 0 4/28/2018  7:58 AM   Dressing Status Clean, dry, & intact 4/28/2018  7:58 AM   Dressing Type Tape;Transparent 4/28/2018  7:58 AM   Hub Color/Line Status Pink;Patent; Flushed; Infusing 4/28/2018  7:58 AM   Alcohol Cap Used No 4/28/2018  7:58 AM        Opportunity for questions and clarification was provided.       Patient transported with:   [employer]

## 2018-04-29 VITALS
DIASTOLIC BLOOD PRESSURE: 78 MMHG | WEIGHT: 182.5 LBS | HEART RATE: 98 BPM | BODY MASS INDEX: 26.13 KG/M2 | OXYGEN SATURATION: 96 % | HEIGHT: 70 IN | RESPIRATION RATE: 16 BRPM | TEMPERATURE: 98.6 F | SYSTOLIC BLOOD PRESSURE: 147 MMHG

## 2018-04-29 PROBLEM — R55 SYNCOPE AND COLLAPSE: Status: RESOLVED | Noted: 2018-04-28 | Resolved: 2018-04-29

## 2018-04-29 PROBLEM — L50.9 URTICARIA: Status: RESOLVED | Noted: 2018-04-28 | Resolved: 2018-04-29

## 2018-04-29 PROBLEM — N17.9 AKI (ACUTE KIDNEY INJURY) (HCC): Status: RESOLVED | Noted: 2018-04-28 | Resolved: 2018-04-29

## 2018-04-29 LAB
ANION GAP SERPL CALC-SCNC: 11 MMOL/L (ref 7–16)
BUN SERPL-MCNC: 22 MG/DL (ref 8–23)
CALCIUM SERPL-MCNC: 7.6 MG/DL (ref 8.3–10.4)
CHLORIDE SERPL-SCNC: 108 MMOL/L (ref 98–107)
CO2 SERPL-SCNC: 22 MMOL/L (ref 21–32)
CREAT SERPL-MCNC: 1.3 MG/DL (ref 0.8–1.5)
GLUCOSE SERPL-MCNC: 140 MG/DL (ref 65–100)
POTASSIUM SERPL-SCNC: 4 MMOL/L (ref 3.5–5.1)
SODIUM SERPL-SCNC: 141 MMOL/L (ref 136–145)

## 2018-04-29 PROCEDURE — 96376 TX/PRO/DX INJ SAME DRUG ADON: CPT

## 2018-04-29 PROCEDURE — 74011250636 HC RX REV CODE- 250/636: Performed by: FAMILY MEDICINE

## 2018-04-29 PROCEDURE — 36415 COLL VENOUS BLD VENIPUNCTURE: CPT | Performed by: FAMILY MEDICINE

## 2018-04-29 PROCEDURE — 74011250637 HC RX REV CODE- 250/637: Performed by: FAMILY MEDICINE

## 2018-04-29 PROCEDURE — 80048 BASIC METABOLIC PNL TOTAL CA: CPT | Performed by: FAMILY MEDICINE

## 2018-04-29 PROCEDURE — 74011250636 HC RX REV CODE- 250/636: Performed by: INTERNAL MEDICINE

## 2018-04-29 PROCEDURE — 99218 HC RM OBSERVATION: CPT

## 2018-04-29 PROCEDURE — 96372 THER/PROPH/DIAG INJ SC/IM: CPT

## 2018-04-29 RX ADMIN — PANTOPRAZOLE SODIUM 40 MG: 40 TABLET, DELAYED RELEASE ORAL at 05:30

## 2018-04-29 RX ADMIN — Medication 1 AMPULE: at 09:00

## 2018-04-29 RX ADMIN — HEPARIN SODIUM 5000 UNITS: 5000 INJECTION, SOLUTION INTRAVENOUS; SUBCUTANEOUS at 05:30

## 2018-04-29 RX ADMIN — SODIUM CHLORIDE 125 ML/HR: 900 INJECTION, SOLUTION INTRAVENOUS at 00:30

## 2018-04-29 RX ADMIN — DIPHENHYDRAMINE HYDROCHLORIDE 25 MG: 50 INJECTION, SOLUTION INTRAMUSCULAR; INTRAVENOUS at 05:30

## 2018-04-29 NOTE — PROGRESS NOTES
Hospitalist (Alma Rosa Lacey) in to see patient,,orders made to discharge patient today,,no prescription written,,IV access taken out of left arm,,no itching complaints but patient  Skin is still red all over his chest and back ,,no hives noted,no shortness of breath or difficulty in swallowing,,no complaints of pain. ..... Jorge Mccullough

## 2018-04-29 NOTE — H&P
HOSPITALIST INITIAL HISTORY AND PHYSICAL    NAME:  Charla Mcintosh   Age:  81 y.o.  :   1935  MRN:   [id number]  PCP: Connor Reese MD  Consulting MD:  Treatment Team: Attending Provider: Arik Hook MD; Utilization Review: Alexander Malcolm RN    CHIEF COMPLAINT: lip swelling, LOC, rash    HISTORY OF PRESENT ILLNESS:   Charla Mcintosh is an 81 y.o. male with a past medical history of GERD and HTN who presents to the ER this morning with complaint of lip swelling around 19:00 last night for which his wife gave him benadryl, followed by feeling lightheaded and an episode of loss of consciousness  for a few seconds around 21:00, causing him to fall down on his bottom beside his bed. He reports that he scraped the R side of his forehead but had no pain after his fall. Shortly after arrival to the ER, he began developing hives all over his back, sides, arms, legs, neck, and face. He has never had a reaction like this one in the past. The rash is very itchy. His lip swelling is resolved, but he does admit to feeling occasional sensation of lump in his throat. No SOB, wheezing, stridor, or hoarseness. No new exposure to food, medication, laundry detergent, animal dander, insect bite or sting that he is aware of. REVIEW OF SYSTEMS: Comprehensive ROS performed and negative except as stated in HPI. Past Medical History:   Diagnosis Date    GERD (gastroesophageal reflux disease)     Hypercholesterolemia     Hypertension         Past Surgical History:   Procedure Laterality Date    HX APPENDECTOMY         Prior to Admission Medications   Prescriptions Last Dose Informant Patient Reported? Taking?   lisinopril-hydroCHLOROthiazide (PRINZIDE, ZESTORETIC) 20-12.5 mg per tablet 2018 at Unknown time  Yes Yes   Sig: Take  by mouth daily. omeprazole (PRILOSEC) 20 mg capsule 2018 at Unknown time  No Yes   Sig: Take 1 Cap by mouth daily.    simvastatin (ZOCOR) 80 mg tablet 2018 at Unknown time  Yes Yes   Sig: Take 40 mg by mouth nightly. terazosin (HYTRIN) 10 mg capsule 2018 at Unknown time  Yes Yes   Sig: Take 10 mg by mouth nightly. Facility-Administered Medications: None       No Known Allergies    FAMILY HISTORY: Reviewed. Negative except No family history on file. Social History   Substance Use Topics    Smoking status: Never Smoker    Smokeless tobacco: Never Used    Alcohol use No         Objective:     Visit Vitals    /74    Pulse 94    Temp 97.6 °F (36.4 °C)    Resp 18    Ht 5' 10\" (1.778 m)    Wt 82.8 kg (182 lb 8 oz)    SpO2 93%    BMI 26.19 kg/m2      Temp (24hrs), Av.8 °F (36.6 °C), Min:97.5 °F (36.4 °C), Max:98.1 °F (36.7 °C)    Oxygen Therapy  O2 Sat (%): 93 % (18 1933)  Pulse via Oximetry: 81 beats per minute (18 1215)  O2 Device: Room air (18 0758)  Physical Exam:  General:    The patient is a pleasant elderly male in no acute distress. Head:   Normocephalic/atraumatic. Eyes:  No palpebral pallor or scleral icterus. ENT:  External auricular and nasal exam within normal limits. Mucous membranes are moist. Oropharynx appears normal.  Neck:  Supple, non-tender, no JVD. Lungs:   Clear to auscultation bilaterally without wheezes or crackles or stridor. No respiratory distress or accessory muscle use. Heart:   Regular rate and rhythm, without murmurs, rubs, or gallops. Abdomen:   Soft, non-tender, non-distended with normoactive bowel sounds. Genitourinary: No tenderness over the bladder or bilateral CVAs. Extremities: Without clubbing, cyanosis, or edema. Skin:     Normal color, texture, and turgor. Widespread confluent erythematous, urticarial rash over back, sides, legs, arms, and neck. Pulses: Radial and dorsalis pedis pulses present 2+ bilaterally. Capillary refill <2s.    Neurologic: CN II-XII grossly intact and symmetrical.     Moving all four extremities well with no focal deficits. Psychiatric: Pleasant demeanor, appropriate affect. Alert and oriented x 3    Data Review:   Recent Results (from the past 24 hour(s))   EKG, 12 LEAD, INITIAL    Collection Time: 04/28/18  1:51 AM   Result Value Ref Range    Ventricular Rate 77 BPM    Atrial Rate 77 BPM    P-R Interval 164 ms    QRS Duration 68 ms    Q-T Interval 402 ms    QTC Calculation (Bezet) 454 ms    Calculated P Axis 20 degrees    Calculated R Axis -27 degrees    Calculated T Axis 33 degrees    Diagnosis       Normal sinus rhythm  Low voltage QRS  Borderline ECG  No previous ECGs available  Confirmed by ANY MONROE (), NUHA RAMIRES ([id number]) on 4/28/2018 11:41:49 AM     CBC WITH AUTOMATED DIFF    Collection Time: 04/28/18  2:21 AM   Result Value Ref Range    WBC 10.0 4.3 - 11.1 K/uL    RBC 4.96 4.23 - 5.67 M/uL    HGB 14.4 13.6 - 17.2 g/dL    HCT 42.1 41.1 - 50.3 %    MCV 84.9 79.6 - 97.8 FL    MCH 29.0 26.1 - 32.9 PG    MCHC 34.2 31.4 - 35.0 g/dL    RDW 13.2 11.9 - 14.6 %    PLATELET 681 (L) 671 - 450 K/uL    MPV 10.8 10.8 - 14.1 FL    DF AUTOMATED      NEUTROPHILS 88 (H) 43 - 78 %    LYMPHOCYTES 7 (L) 13 - 44 %    MONOCYTES 5 4.0 - 12.0 %    EOSINOPHILS 0 (L) 0.5 - 7.8 %    BASOPHILS 0 0.0 - 2.0 %    IMMATURE GRANULOCYTES 0 0.0 - 5.0 %    ABS. NEUTROPHILS 8.8 (H) 1.7 - 8.2 K/UL    ABS. LYMPHOCYTES 0.7 0.5 - 4.6 K/UL    ABS. MONOCYTES 0.5 0.1 - 1.3 K/UL    ABS. EOSINOPHILS 0.0 0.0 - 0.8 K/UL    ABS. BASOPHILS 0.0 0.0 - 0.2 K/UL    ABS. IMM.  GRANS. 0.0 0.0 - 0.5 K/UL   METABOLIC PANEL, COMPREHENSIVE    Collection Time: 04/28/18  2:21 AM   Result Value Ref Range    Sodium 141 136 - 145 mmol/L    Potassium 3.7 3.5 - 5.1 mmol/L    Chloride 106 98 - 107 mmol/L    CO2 23 21 - 32 mmol/L    Anion gap 12 7 - 16 mmol/L    Glucose 177 (H) 65 - 100 mg/dL    BUN 24 (H) 8 - 23 MG/DL    Creatinine 2.11 (H) 0.8 - 1.5 MG/DL    GFR est AA 39 (L) >60 ml/min/1.73m2    GFR est non-AA 32 (L) >60 ml/min/1.73m2    Calcium 7.7 (L) 8.3 - 10.4 MG/DL    Bilirubin, total 0.9 0.2 - 1.1 MG/DL    ALT (SGPT) 15 12 - 65 U/L    AST (SGOT) 11 (L) 15 - 37 U/L    Alk. phosphatase 30 (L) 50 - 136 U/L    Protein, total 4.8 (L) 6.3 - 8.2 g/dL    Albumin 2.6 (L) 3.2 - 4.6 g/dL    Globulin 2.2 (L) 2.3 - 3.5 g/dL    A-G Ratio 1.2 1.2 - 3.5     TROPONIN I    Collection Time: 04/28/18  2:21 AM   Result Value Ref Range    Troponin-I, Qt. <0.04 0.02 - 0.05 NG/ML   POC LACTIC ACID    Collection Time: 04/28/18  2:33 AM   Result Value Ref Range    Lactic Acid (POC) 1.7 0.5 - 1.9 mmol/L       Imaging /Procedures /Studies:  Ct Head Wo Cont    Result Date: 4/28/2018  IMPRESSION: Unremarkable brain CT without intravenous contrast. Date of Dictation: 4/28/2018 2:56 AM     Xr Chest Port    Result Date: 4/28/2018  IMPRESSION: Normal chest.          Assessment and Plan:     Principal Problem:    Anaphylaxis     Consistent with hx of itching, lip swelling, urticaria, and syncope with hypotension. Etiology uncertain. Pt has been on Lisinopril for years. Will discontinue lisinopril for now, given no other obvious etiology. Pt already received epinephrine in ER. Will start IV Solumedrol 60 BID, Pepcid 40 IV BID, Benadryl 25 PO q6h. No evidence of airway occlusion, but will monitor closely in ICU. Recommend EpiPen and outpatient consultation with allergist given extreme reaction. Active Problems:    Syncope and collapse (4/28/2018)    Per above. HECTOR    Likely related to hypotension. IVF, follow BMP. Urticaria (4/28/2018)    Per above      GERD (gastroesophageal reflux disease) (1/31/2017)    Stable. HLD (hyperlipidemia) (1/31/2017)    Stable, continue home meds. HTN    Holding lisinopril. Can resume HCTZ if necessary. DVT Prophylaxis: Heparin. Code Status: FULL CODE      Disposition: Observe in ICU for evaluation and treatment as per above.       Anticipated discharge: < 2 midnights     Signed By: Grecia Hyman MD     April 28, 2018

## 2018-04-29 NOTE — PROGRESS NOTES
Discharge instructions given to patient with his wife listening in,,no prescriptions given,nor written by discharging physician,,given opportunity for quedtions and for clarifications,,discharged per wheel chair,stable condition so far accompanied by wife who is taking him home by car. ...... Mathew Hannon

## 2018-04-29 NOTE — DISCHARGE INSTRUCTIONS
1) GET AN APPOINTMENT TO SEE YOUR MEDICAL PHYSICIAN AND TO SEE AN \"ALLERGIST\" AS  REQUESTED BY YOUR PHYSICIAN HERE. ... Anaphylactic Reaction: Care Instructions  Your Care Instructions    A bad allergic reaction affects your whole body. Doctors call it an anaphylactic reaction. Your immune system may have reacted to food or medicine. Or maybe you had an insect bite or sting. This kind of reaction can happen the first time you come into contact with a substance. Or it may take many times before a substance causes a problem. You need to get help right away if your body reacts like this again. Follow-up care is a key part of your treatment and safety. Be sure to make and go to all appointments, and call your doctor if you are having problems. It's also a good idea to know your test results and keep a list of the medicines you take. How can you care for yourself at home? · If your doctor has prescribed medicine, such as an antihistamine, take it exactly as directed. Call your doctor if you think you are having a problem with your medicine. · Learn all you can about your allergies. You may be able to avoid a severe response when you do or don't do certain things. For instance, you can check food or drug labels for contents that might cause problems. · Your doctor may prescribe a shot of epinephrine to carry with you in case you have a severe reaction. Learn how to give yourself the shot. Keep it with you at all times. Make sure it has not . · Wear medical alert jewelry that lists your allergies. You can buy this at most drugstores. · Teach your family and friends about your allergies. Tell them what you need to avoid. Teach them what to do if you have a reaction. · Before you take any medicine, tell your doctor if you have had a bad response to any medicines in the past.  When should you call for help? Give an epinephrine shot if:  ? · You think you are having a severe allergic reaction. ?After giving an epinephrine shot call 911, even if you feel better. ?Call 911 if:  ? · You have symptoms of a severe allergic reaction. These may include:  ¨ Sudden raised, red areas (hives) all over your body. ¨ Swelling of the throat, mouth, lips, or tongue. ¨ Trouble breathing. ¨ Passing out (losing consciousness). Or you may feel very lightheaded or suddenly feel weak, confused, or restless. ? · You have been given an epinephrine shot, even if you feel better. ?Call your doctor now or seek immediate medical care if:  ? · You have symptoms of an allergic reaction, such as:  ¨ A rash or hives (raised, red areas on the skin). ¨ Itching. ¨ Swelling. ¨ Belly pain, nausea, or vomiting. ? Watch closely for changes in your health, and be sure to contact your doctor if:  ? · You do not get better as expected. Where can you learn more? Go to http://massimo-chelsea.info/. Enter E186 in the search box to learn more about \"Anaphylactic Reaction: Care Instructions. \"  Current as of: September 29, 2016  Content Version: 11.4  © 1294-0986 [employer]. Care instructions adapted under license by [employer] (which disclaims liability or warranty for this information). If you have questions about a medical condition or this instruction, always ask your healthcare professional. [place] [address] any warranty or liability for your use of this information.

## 2018-04-29 NOTE — DISCHARGE SUMMARY
Hospitalist Discharge Summary     Admit Date:  2018  1:48 AM   Name:  Debbie Still   Age:  81 y.o.  :  1935   MRN:  [id number]   PCP:  Sydney James MD  Treatment Team: Attending Provider: Pedro Jose MD; Utilization Review: Tayo Landis RN    Problem List for this Hospitalization:  Hospital Problems as of 2018  Date Reviewed: 2017          Codes Class Noted - Resolved POA    HTN (hypertension) ICD-10-CM: I10  ICD-9-CM: 401.9  2017 - Present Yes        GERD (gastroesophageal reflux disease) (Chronic) ICD-10-CM: K21.9  ICD-9-CM: 530.81  2017 - Present Yes        HLD (hyperlipidemia) (Chronic) ICD-10-CM: E78.5  ICD-9-CM: 272.4  2017 - Present Yes        RESOLVED: Anaphylaxis ICD-10-CM: T78. 2XXA  ICD-9-CM: 995.0  2018 - 2018 Yes        RESOLVED: Syncope and collapse ICD-10-CM: R55  ICD-9-CM: 780.2  2018 - 2018 Yes        RESOLVED: Urticaria ICD-10-CM: L50.9  ICD-9-CM: 708.9  2018 - 2018 Yes        RESOLVED: Lip swelling ICD-10-CM: R22.0  ICD-9-CM: 784.2  2018 - 2018 Yes        * (Principal)RESOLVED: [facility] (acute kidney injury) ([facility] ICD-10-CM: N17.9  ICD-9-CM: 584.9  2018 - 2018 Yes                Admission HPI from 2018:    \" Debbie Still is an 81 y.o. male with a past medical history of GERD and HTN who presents to the ER this morning with complaint of lip swelling around 19:00 last night for which his wife gave him benadryl, followed by feeling lightheaded and an episode of loss of consciousness  for a few seconds around 21:00, causing him to fall down on his bottom beside his bed. He reports that he scraped the R side of his forehead but had no pain after his fall. Shortly after arrival to the ER, he began developing hives all over his back, sides, arms, legs, neck, and face. He has never had a reaction like this one in the past. The rash is very itchy.  His lip swelling is resolved, but he does admit to feeling occasional sensation of lump in his throat. No SOB, wheezing, stridor, or hoarseness. No new exposure to food, medication, laundry detergent, animal dander, insect bite or sting that he is aware of.  \"    Hospital Course:  Pt was placed in observation. He responded well to IV steroids and they were stopped later in the day after admission to see if he continued to improve which he did. It is unclear what caused his reaction. Think it unlikely to be lisinopril; while it can cause angioedema even in patients who have been taking it for years, I have never heard of sudden onset hives occurring in a med taken daily for years. He needs a referral to an allergist for testing. His HECTOR improved with IVF. Likely caused by combo of hypotension and dehydration from his lisinopril/HCTZ. He said he was running in the 15V systolic verified at an optho appointment on his lisinopril/hctz. His BP improved. While slightly above goal currently, it will not hurt him to stay in the 539W-560R systolic until he can follow up with PCP. Would consider starting with a lower dose BP med, maybe norvasc 2.5mg, with close follow up. Follow up instructions and discharge meds at bottom of this note. Plan was discussed with pt. All questions answered. Patient was stable at time of discharge. Diagnostic Imaging/Tests:   Ct Head Wo Cont    Result Date: 4/28/2018  CT HEAD WITHOUT CONTRAST HISTORY:  Syncope, head trauma. . COMPARISON: None. TECHNIQUE: Axial imaging was performed without intravenous contrast utilizing 5mm slice thickness. Sagittal and coronal reformats were performed. Radiation dose reduction techniques were used for this study. Our CT scanner uses one or all of the following: Automated exposure control, adjustment of the MAS or KUB according to patient's size and iterative reconstruction.  FINDINGS:    *BRAIN:    -  There are no early signs of territorial or lacunar infarction by CT.    -  No intracranial mass, hematoma, or hydrocephalus.    -  No gross white matter abnormality by CT. *VISUALIZED PARANASAL SINUSES: Well aerated. *MASTOIDS:  Clear. *CALVARIUM AND SCALP: Unremarkable. IMPRESSION: Unremarkable brain CT without intravenous contrast. Date of Dictation: 2018 2:56 AM     Xr Chest Port    Result Date: 2018  EXAM:  XR CHEST PORT INDICATION:  syncope COMPARISON:  None. FINDINGS: A portable AP radiograph of the chest was obtained at 0246 hours. The patient is on a cardiac monitor. The lungs are clear. The cardiac and mediastinal contours and pulmonary vascularity are normal.  The bones and soft tissues are grossly within normal limits. IMPRESSION: Normal chest.       Echocardiogram results:  Results for orders placed or performed during the hospital encounter of 18   2D ECHO COMPLETE ADULT (TTE) W OR [address] [facility] [address] [facility], [address] [facility]  [phone]    Transthoracic Echocardiogram  2D, M-mode, Doppler, and Color Doppler    Patient: Pam Collins  MR #: [id number]  : 1935  Age: 81 years  Gender: Male  Study date: 2018  Account #: [de-identified]  Height: 70 in  Weight: 181.7 lb  BSA: 2.01 mï¾²  Status:Routine  Location: 376  BP: 94/ 58    Allergies: NO KNOWN ALLERGIES    Sonographer:  Dimple Munoz [facility]  Group:  [id number] Cardiology  Referring Physician:  Evelyn Davis MD  Reading Physician:  Jonna Dye MD [facility]    INDICATIONS: Syncope. PROCEDURE: This was a routine study. A transthoracic echocardiogram was  performed. The study included complete 2D imaging, M-mode, complete spectral  Doppler, and color Doppler. Intravenous contrast (Definity) was administered. Image quality was adequate. LEFT VENTRICLE: Size was normal. Systolic function was normal. Ejection  fraction was estimated in the range of 60 % to 65 %. There were no regional  wall motion abnormalities. Wall thickness was mildly increased. Doppler  parameters were consistent with mild diastolic dysfunction (grade 1). RIGHT VENTRICLE: The size was normal. Systolic function was normal. The  tricuspid jet envelope definition was inadequate for estimation of RV   systolic  pressure. LEFT ATRIUM: Size was normal.    RIGHT ATRIUM: Size was normal.    SYSTEMIC VEINS: IVC: The inferior vena cava was not well visualized. AORTIC VALVE: The valve was not well visualized. There was no evidence for  stenosis. There was no insufficiency. MITRAL VALVE: Valve structure was normal. There was no evidence for stenosis. There was no regurgitation. TRICUSPID VALVE: The valve structure was normal. There was no evidence for  stenosis. There was trace regurgitation. PULMONIC VALVE: Not well visualized. There was no evidence for stenosis. There  was no insufficiency. PERICARDIUM: There was no pericardial effusion. AORTA: The root exhibited normal size. SUMMARY:    -  Left ventricle: Systolic function was normal. Ejection fraction was  estimated in the range of 60 % to 65 %. There were no regional wall motion  abnormalities. Wall thickness was mildly increased. Doppler parameters were  consistent with mild diastolic dysfunction (grade 1). SYSTEM MEASUREMENT TABLES    2D mode  AoR Diam (2D): 3.3 cm  LA Dimension (2D): 3.2 cm  Left Atrium Systolic Volume Index; Method of Disks, Biplane; 2D mode;: 28.9  ml/m2  IVS/LVPW (2D): 1.1  IVSd (2D): 1.2 cm  LVIDd (2D): 3.8 cm  LVIDs (2D): 2.5 cm  LVOT Area (2D): 3.5 cm2  LVPWd (2D): 1.1 cm    Tissue Doppler Imaging  LV Peak Early Patterson Tissue Haile; Lateral MA (TDI): 9.2 cm/s  LV Peak Early Patterson Tissue Haile; Medial MA (TDI): 6 cm/s    Unspecified Scan Mode  Peak Grad; Mean; Antegrade Flow: 8 mm[Hg]  Vmax;  Antegrade Flow: 144 cm/s  LVOT Diam: 2.1 cm  MV Peak Haile/LV Peak Tissue Haile E-Wave; Lateral MA: 6.7  MV Peak Haile/LV Peak Tissue Haile E-Wave; Medial MA: 10.1    Prepared and signed by    Abisai Dillard MD [facility]  Signed 28-Apr-2018 10:33:58           All Micro Results     Procedure Component Value Units Date/Time    CULTURE, BLOOD [[id number]]     Order Status:  Canceled Specimen:  Whole Blood from Blood     CULTURE, BLOOD [[id number]]     Order Status:  Canceled Specimen:  Whole Blood from Blood           Labs: Results:       BMP, Mg, Phos Recent Labs      04/29/18   0502  04/28/18 0221   NA  141  141   K  4.0  3.7   CL  108*  106   CO2  22  23   AGAP  11  12   BUN  22  24*   CREA  1.30  2.11*   CA  7.6*  7.7*   GLU  140*  177*      CBC Recent Labs      04/28/18 0221   WBC  10.0   RBC  4.96   HGB  14.4   HCT  42.1   PLT  128*   GRANS  88*   LYMPH  7*   EOS  0*   MONOS  5   BASOS  0   IG  0   ANEU  8.8*   ABL  0.7   RADHA  0.0   ABM  0.5   ABB  0.0   AIG  0.0      LFT Recent Labs      04/28/18 0221   SGOT  11*   ALT  15   AP  30*   TP  4.8*   ALB  2.6*   GLOB  2.2*   AGRAT  1.2      Cardiac Testing Lab Results   Component Value Date/Time    Troponin-I, Qt. <0.04 04/28/2018 02:21 AM      Coagulation Tests No results found for: PTP, INR, APTT   A1c No results found for: HBA1C, HGBE8, CNY8XLPA, OSM6DITO   Lipid Panel No results found for: CHOL, CHOLPOCT, CHOLX, CHLST, CHOLV, 369492, HDL, LDL, LDLC, DLDLP, 829155, VLDLC, VLDL, TGLX, TRIGL, TRIGP, TGLPOCT, CHHD, CHHDX   Thyroid Panel No results found for: TSH, T4, FT4, TT3, T3U, TSHEXT, TSHEXT     Most Recent UA No results found for: COLOR, APPRN, REFSG, ROSE MARIE, PROTU, GLUCU, KETU, BILU, BLDU, UROU, ELSIE, LEUKU     No Known Allergies  Immunization History   Administered Date(s) Administered    Influenza High Dose Vaccine PF 10/11/2016    Pneumococcal Conjugate (PCV-13) 07/12/2016    Tdap 01/17/2017       All Labs from Last 24 Hrs:  Recent Results (from the past 24 hour(s))   METABOLIC PANEL, BASIC    Collection Time: 04/29/18  5:02 AM   Result Value Ref Range    Sodium 141 136 - 145 mmol/L    Potassium 4.0 3.5 - 5.1 mmol/L    Chloride 108 (H) 98 - 107 mmol/L    CO2 22 21 - 32 mmol/L    Anion gap 11 7 - 16 mmol/L    Glucose 140 (H) 65 - 100 mg/dL    BUN 22 8 - 23 MG/DL    Creatinine 1.30 0.8 - 1.5 MG/DL    GFR est AA >60 >60 ml/min/1.73m2    GFR est non-AA 56 (L) >60 ml/min/1.73m2    Calcium 7.6 (L) 8.3 - 10.4 MG/DL       Discharge Exam:  Patient Vitals for the past 24 hrs:   Temp Pulse Resp BP SpO2   04/29/18 0708 98.6 °F (37 °C) 98 16 147/78 96 %   04/29/18 0536 97.8 °F (36.6 °C) (!) 108 18 145/69 94 %   04/29/18 0108 98 °F (36.7 °C) 95 18 131/73 94 %   04/28/18 1933 97.6 °F (36.4 °C) 94 18 145/74 93 %   04/28/18 1730 98 °F (36.7 °C) 90 18 126/68 93 %   04/28/18 1334 97.5 °F (36.4 °C) 93 18 136/79 97 %   04/28/18 1215 - 80 20 119/71 96 %   04/28/18 1200 - 78 20 115/68 95 %   04/28/18 1130 98.1 °F (36.7 °C) - - - -   04/28/18 1115 - 80 30 118/72 95 %   04/28/18 1000 - 84 (!) 33 122/70 95 %   04/28/18 0900 - 84 23 114/64 93 %   04/28/18 0800 - 69 21 114/71 97 %   04/28/18 0758 97.9 °F (36.6 °C) 73 21 107/70 96 %     Oxygen Therapy  O2 Sat (%): 96 % (04/29/18 0708)  Pulse via Oximetry: 81 beats per minute (04/28/18 1215)  O2 Device: Room air (04/28/18 0758)    Intake/Output Summary (Last 24 hours) at 04/29/18 0745  Last data filed at 04/28/18 1325   Gross per 24 hour   Intake             1943 ml   Output              415 ml   Net             1528 ml       General:    Well nourished. Alert. No distress. Eyes:   Normal sclera. Extraocular movements intact. ENT:  Normocephalic, atraumatic. Moist mucous membranes  CV:   Regular rate and rhythm. No murmur, rub, or gallop. Lungs:  Clear to auscultation bilaterally. No wheezing, rhonchi, or rales. Abdomen: Soft, nontender, nondistended. Bowel sounds normal.   Extremities: Warm and dry. No cyanosis or edema. Neurologic: CN II-XII grossly intact. Sensation intact. Skin:     No rashes or jaundice. Psych:  Normal mood and affect.     Discharge Info:   Current Discharge Medication List CONTINUE these medications which have NOT CHANGED    Details   omeprazole (PRILOSEC) 20 mg capsule Take 1 Cap by mouth daily. Qty: 90 Cap, Refills: 1    Associated Diagnoses: Gastroesophageal reflux disease without esophagitis      terazosin (HYTRIN) 10 mg capsule Take 10 mg by mouth nightly. simvastatin (ZOCOR) 80 mg tablet Take 40 mg by mouth nightly. STOP taking these medications       lisinopril-hydroCHLOROthiazide (PRINZIDE, ZESTORETIC) 20-12.5 mg per tablet Comments:   Reason for Stopping:                 Disposition: home    Activity: Activity as tolerated   Diet: DIET REGULAR    Follow-up Appointments   Procedures    FOLLOW UP VISIT Appointment in: 3 - 5 Days PCP for follow up, referral to allergist     PCP for follow up, referral to allergist     Standing Status:   Standing     Number of Occurrences:   1     Order Specific Question:   Appointment in     Answer:   3 - 5 Days         Follow-up Information     Follow up With Details Comments Contact Info    Elkin Bailey MD Schedule an appointment as soon as possible for a visit follow up blood pressure and referral to allergist [id number] [facility]  [phone]            Time spent in patient discharge planning and coordination 35 minutes.     Signed:  Rodríguez Trammell MD

## 2018-04-29 NOTE — PROGRESS NOTES
Wife called and stated that patient was discharged earlier today. He just developed same rashes that brought him to hospital on 4/28/2018. No shortness of breath. No face swelling. Patient john took Benadryl PO. I called pharmacy at [address] [place] [address] to prescribe Prednisone 20 mg po bid for 5 days for him. No refills. I advised her that \"If patient's condition is worsened, call your doctor or return to hospital.   When follow up with your doctor, make sure that your doctor is aware of this admission and review hospital record and follow up on lab results for continuity of care. Also, review your medications with your doctor for possible need of adjustment or refills. \"    I updated Dr. Sigrid Campos regarding this.

## 2018-05-15 PROBLEM — Z00.00 ANNUAL PHYSICAL EXAM: Status: ACTIVE | Noted: 2018-05-15

## 2018-06-21 PROBLEM — R04.0 EPISTAXIS, RECURRENT: Status: ACTIVE | Noted: 2018-06-21

## 2019-01-07 PROBLEM — R73.01 IFG (IMPAIRED FASTING GLUCOSE): Status: ACTIVE | Noted: 2019-01-07

## 2019-01-07 PROBLEM — R04.0 EPISTAXIS, RECURRENT: Status: RESOLVED | Noted: 2018-06-21 | Resolved: 2019-01-07

## 2020-07-14 PROBLEM — R73.02 IGT (IMPAIRED GLUCOSE TOLERANCE): Status: ACTIVE | Noted: 2019-01-07
